# Patient Record
Sex: MALE | Race: OTHER | HISPANIC OR LATINO | URBAN - METROPOLITAN AREA
[De-identification: names, ages, dates, MRNs, and addresses within clinical notes are randomized per-mention and may not be internally consistent; named-entity substitution may affect disease eponyms.]

---

## 2018-11-01 ENCOUNTER — EMERGENCY (EMERGENCY)
Facility: HOSPITAL | Age: 58
LOS: 1 days | Discharge: HOME | End: 2018-11-01
Attending: EMERGENCY MEDICINE
Payer: COMMERCIAL

## 2018-11-01 VITALS
SYSTOLIC BLOOD PRESSURE: 123 MMHG | RESPIRATION RATE: 14 BRPM | HEART RATE: 71 BPM | TEMPERATURE: 98 F | OXYGEN SATURATION: 99 % | DIASTOLIC BLOOD PRESSURE: 87 MMHG

## 2018-11-01 VITALS
HEART RATE: 74 BPM | TEMPERATURE: 98 F | WEIGHT: 164.91 LBS | SYSTOLIC BLOOD PRESSURE: 129 MMHG | HEIGHT: 64 IN | DIASTOLIC BLOOD PRESSURE: 81 MMHG | OXYGEN SATURATION: 98 %

## 2018-11-01 PROCEDURE — 71046 X-RAY EXAM CHEST 2 VIEWS: CPT | Mod: 26

## 2018-11-01 PROCEDURE — 99283 EMERGENCY DEPT VISIT LOW MDM: CPT

## 2018-11-01 PROCEDURE — 73000 X-RAY EXAM OF COLLAR BONE: CPT | Mod: 26,LT

## 2018-11-01 PROCEDURE — 99284 EMERGENCY DEPT VISIT MOD MDM: CPT | Mod: 25

## 2018-11-01 PROCEDURE — 71046 X-RAY EXAM CHEST 2 VIEWS: CPT

## 2018-11-01 PROCEDURE — 73000 X-RAY EXAM OF COLLAR BONE: CPT
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
## 2018-11-01 NOTE — ED ADULT NURSE NOTE - NSIMPLEMENTINTERV_GEN_ALL_ED
Implemented All Universal Safety Interventions:  [place] to call system. Call bell, personal items and telephone within reach. Instruct patient to call for assistance. Room bathroom lighting operational. Non-slip footwear when patient is off stretcher. Physically safe environment: no spills, clutter or unnecessary equipment. Stretcher in lowest position, wheels locked, appropriate side rails in place.

## 2018-11-01 NOTE — ED PROVIDER NOTE - CHPI ED SYMPTOMS NEG
no back pain/no headache/no neck tenderness/no dizziness/no laceration/no loss of consciousness/no disorientation
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
## 2018-11-01 NOTE — ED ADULT NURSE NOTE - CHPI ED NUR SYMPTOMS NEG
no fussiness/no loss of consciousness/no difficulty bearing weight/no disorientation/no dizziness/no neck tenderness/no sleeping issues/no back pain/no bruising/no crying/no decreased eating/drinking/no headache/no acting out behaviors/no laceration

## 2020-10-28 NOTE — ED ADULT NURSE NOTE - CAS DISCH TRANSFER METHOD

## 2021-04-16 NOTE — ED PROVIDER NOTE - CROS ED ROS STATEMENT
Martinez, SONYA Villegas, and SONYA Eugene aware and at bedside. Repeat chest xray performed in chair.
all other ROS negative except as per HPI

## 2022-11-13 ENCOUNTER — OFFICE (OUTPATIENT)
Dept: URBAN - METROPOLITAN AREA CLINIC 103 | Facility: CLINIC | Age: 62
Setting detail: OPHTHALMOLOGY
End: 2022-11-13
Payer: COMMERCIAL

## 2022-11-13 DIAGNOSIS — H40.1131: ICD-10-CM

## 2022-11-13 PROCEDURE — 99024 POSTOP FOLLOW-UP VISIT: CPT | Performed by: OPHTHALMOLOGY

## 2022-11-13 ASSESSMENT — REFRACTION_CURRENTRX
OD_OVR_VA: 20/
OD_ADD: +2.25
OS_CYLINDER: SPHERE
OS_OVR_VA: 20/
OS_VPRISM_DIRECTION: PROGS
OS_SPHERE: +1.75
OS_ADD: +2.25
OD_VPRISM_DIRECTION: PROGS
OD_AXIS: 180
OD_CYLINDER: -0.25
OD_SPHERE: +1.75

## 2022-11-13 ASSESSMENT — AXIALLENGTH_DERIVED
OD_AL: 23.3631
OS_AL: 22.9161
OS_AL: 23.1482
OD_AL: 23.1267

## 2022-11-13 ASSESSMENT — REFRACTION_MANIFEST
OS_SPHERE: +2.00
OD_ADD: +2.25
OD_AXIS: 101
OS_AXIS: 090
OS_ADD: +2.25
OD_CYLINDER: -0.25
OD_VA1: 20/20
OS_VA1: 20/20
OD_SPHERE: +2.25
OS_CYLINDER: -0.50

## 2022-11-13 ASSESSMENT — VISUAL ACUITY
OS_BCVA: 20/25
OD_BCVA: 20/20

## 2022-11-13 ASSESSMENT — CONFRONTATIONAL VISUAL FIELD TEST (CVF)
OD_FINDINGS: FULL
OS_FINDINGS: FULL

## 2022-11-13 ASSESSMENT — SPHEQUIV_DERIVED
OD_SPHEQUIV: 2.75
OS_SPHEQUIV: 2.375
OD_SPHEQUIV: 2.125
OS_SPHEQUIV: 1.75

## 2022-11-13 ASSESSMENT — KERATOMETRY
OS_AXISANGLE_DEGREES: 011
OD_K1POWER_DIOPTERS: 41.50
OS_K2POWER_DIOPTERS: 43.25
OS_K1POWER_DIOPTERS: 42.50
OD_K2POWER_DIOPTERS: 42.25
OD_AXISANGLE_DEGREES: 011

## 2022-11-13 ASSESSMENT — REFRACTION_AUTOREFRACTION
OS_AXIS: 095
OD_SPHERE: +3.25
OS_CYLINDER: -0.75
OS_SPHERE: +2.75
OD_AXIS: 098
OD_CYLINDER: -1.00

## 2022-11-13 ASSESSMENT — TONOMETRY
OD_IOP_MMHG: 14
OS_IOP_MMHG: 15

## 2022-11-13 ASSESSMENT — PACHYMETRY
OS_CT_CORRECTION: -1
OS_CT_UM: 568

## 2023-02-18 ENCOUNTER — OFFICE (OUTPATIENT)
Dept: URBAN - METROPOLITAN AREA CLINIC 103 | Facility: CLINIC | Age: 63
Setting detail: OPHTHALMOLOGY
End: 2023-02-18
Payer: COMMERCIAL

## 2023-02-18 DIAGNOSIS — H40.1131: ICD-10-CM

## 2023-02-18 DIAGNOSIS — H17.9: ICD-10-CM

## 2023-02-18 PROCEDURE — 92012 INTRM OPH EXAM EST PATIENT: CPT | Performed by: OPHTHALMOLOGY

## 2023-02-18 ASSESSMENT — AXIALLENGTH_DERIVED
OS_AL: 23.2369
OS_AL: 23.1428
OD_AL: 23.0468
OD_AL: 23.1401

## 2023-02-18 ASSESSMENT — PACHYMETRY
OS_CT_CORRECTION: -1
OS_CT_UM: 568

## 2023-02-18 ASSESSMENT — REFRACTION_MANIFEST
OS_VA1: 20/20
OS_ADD: +2.25
OD_AXIS: 101
OS_SPHERE: +2.00
OS_CYLINDER: -0.50
OD_ADD: +2.25
OD_SPHERE: +2.25
OD_VA1: 20/20
OS_AXIS: 090
OD_CYLINDER: -0.25

## 2023-02-18 ASSESSMENT — REFRACTION_AUTOREFRACTION
OD_AXIS: 097
OD_CYLINDER: -0.75
OS_AXIS: 123
OS_CYLINDER: -0.50
OD_SPHERE: +2.75
OS_SPHERE: +2.25

## 2023-02-18 ASSESSMENT — REFRACTION_CURRENTRX
OD_SPHERE: +1.75
OD_CYLINDER: SPHERE
OS_ADD: +2.00
OD_OVR_VA: 20/
OS_SPHERE: +2.00
OS_CYLINDER: -0.25
OD_ADD: +2.00
OS_VPRISM_DIRECTION: PROGS
OS_OVR_VA: 20/
OS_AXIS: 113
OD_VPRISM_DIRECTION: PROGS

## 2023-02-18 ASSESSMENT — SPHEQUIV_DERIVED
OS_SPHEQUIV: 2
OD_SPHEQUIV: 2.375
OD_SPHEQUIV: 2.125
OS_SPHEQUIV: 1.75

## 2023-02-18 ASSESSMENT — KERATOMETRY
OS_K1POWER_DIOPTERS: 42.25
OS_K2POWER_DIOPTERS: 43.00
OD_K2POWER_DIOPTERS: 42.75
OS_AXISANGLE_DEGREES: 026
OD_AXISANGLE_DEGREES: 001
OD_K1POWER_DIOPTERS: 42.25

## 2023-02-18 ASSESSMENT — TONOMETRY
OS_IOP_MMHG: 16
OD_IOP_MMHG: 14

## 2023-02-18 ASSESSMENT — VISUAL ACUITY
OS_BCVA: 20/25-2
OD_BCVA: 20/30+2

## 2023-02-18 ASSESSMENT — CONFRONTATIONAL VISUAL FIELD TEST (CVF)
OD_FINDINGS: FULL
OS_FINDINGS: FULL

## 2023-07-18 ENCOUNTER — OFFICE (OUTPATIENT)
Dept: URBAN - METROPOLITAN AREA CLINIC 103 | Facility: CLINIC | Age: 63
Setting detail: OPHTHALMOLOGY
End: 2023-07-18
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
## 2023-07-18 PROCEDURE — NO SHOW FE NO SHOW FEE: Performed by: OPHTHALMOLOGY
